# Patient Record
Sex: FEMALE | Race: BLACK OR AFRICAN AMERICAN
[De-identification: names, ages, dates, MRNs, and addresses within clinical notes are randomized per-mention and may not be internally consistent; named-entity substitution may affect disease eponyms.]

---

## 2018-10-15 ENCOUNTER — HOSPITAL ENCOUNTER (EMERGENCY)
Dept: HOSPITAL 35 - ER | Age: 48
Discharge: HOME | End: 2018-10-15
Payer: COMMERCIAL

## 2018-10-15 VITALS — BODY MASS INDEX: 29.45 KG/M2 | HEIGHT: 62 IN | WEIGHT: 160.01 LBS

## 2018-10-15 DIAGNOSIS — G43.909: ICD-10-CM

## 2018-10-15 DIAGNOSIS — I50.9: ICD-10-CM

## 2018-10-15 DIAGNOSIS — H60.91: ICD-10-CM

## 2018-10-15 DIAGNOSIS — R07.89: Primary | ICD-10-CM

## 2018-10-15 LAB
ALBUMIN SERPL-MCNC: 3.2 G/DL (ref 3.4–5)
ALT SERPL-CCNC: 24 U/L (ref 30–65)
ANION GAP SERPL CALC-SCNC: 8 MMOL/L (ref 7–16)
AST SERPL-CCNC: 28 U/L (ref 15–37)
BASOPHILS NFR BLD AUTO: 0.6 % (ref 0–2)
BILIRUB SERPL-MCNC: 0.5 MG/DL
BUN SERPL-MCNC: 14 MG/DL (ref 7–18)
CALCIUM SERPL-MCNC: 9 MG/DL (ref 8.5–10.1)
CHLORIDE SERPL-SCNC: 106 MMOL/L (ref 98–107)
CO2 SERPL-SCNC: 22 MMOL/L (ref 21–32)
CREAT SERPL-MCNC: 0.7 MG/DL (ref 0.6–1)
EOSINOPHIL NFR BLD: 1.2 % (ref 0–3)
ERYTHROCYTE [DISTWIDTH] IN BLOOD BY AUTOMATED COUNT: 14.9 % (ref 10.5–14.5)
GLUCOSE SERPL-MCNC: 88 MG/DL (ref 74–106)
GRANULOCYTES NFR BLD MANUAL: 65.9 % (ref 36–66)
HCT VFR BLD CALC: 39.5 % (ref 37–47)
HGB BLD-MCNC: 13.3 GM/DL (ref 12–15)
LYMPHOCYTES NFR BLD AUTO: 23.6 % (ref 24–44)
MAGNESIUM SERPL-MCNC: 2.2 MG/DL (ref 1.8–2.4)
MCH RBC QN AUTO: 31 PG (ref 26–34)
MCHC RBC AUTO-ENTMCNC: 33.6 G/DL (ref 28–37)
MCV RBC: 92.2 FL (ref 80–100)
MONOCYTES NFR BLD: 8.7 % (ref 1–8)
NEUTROPHILS # BLD: 5.6 THOU/UL (ref 1.4–8.2)
PLATELET # BLD: 278 THOU/UL (ref 150–400)
POTASSIUM SERPL-SCNC: 4.2 MMOL/L (ref 3.5–5.1)
PROT SERPL-MCNC: 7.1 G/DL (ref 6.4–8.2)
RBC # BLD AUTO: 4.28 MIL/UL (ref 4.2–5)
SODIUM SERPL-SCNC: 136 MMOL/L (ref 136–145)
WBC # BLD AUTO: 8.4 THOU/UL (ref 4–11)

## 2018-12-28 ENCOUNTER — HOSPITAL ENCOUNTER (OUTPATIENT)
Dept: HOSPITAL 35 - PAIN | Age: 48
End: 2018-12-28
Payer: COMMERCIAL

## 2018-12-28 VITALS — DIASTOLIC BLOOD PRESSURE: 67 MMHG | SYSTOLIC BLOOD PRESSURE: 114 MMHG

## 2018-12-28 VITALS — WEIGHT: 164.8 LBS | BODY MASS INDEX: 29.94 KG/M2 | HEIGHT: 62.01 IN

## 2018-12-28 DIAGNOSIS — I10: ICD-10-CM

## 2018-12-28 DIAGNOSIS — M19.90: Primary | ICD-10-CM

## 2018-12-28 DIAGNOSIS — Z91.81: ICD-10-CM

## 2018-12-28 DIAGNOSIS — Z79.891: ICD-10-CM

## 2018-12-28 NOTE — HPC
[facility]
[address]
[place]   [address]                     PAIN MANAGEMENT CONSULTATION  
_______________________________________________________________________________
 
Name:       PAPA SO                Room #:                     REG LAURITA FARRELL.#:      [id number]                       Account #:      [id number]  
Admission:  12/28/18    Attend Phys:    MARY Fuentes MD    
Discharge:              Date of Birth:  [birth year]  
                                                          Report #: [id number]
                                                                    [id number]   
_______________________________________________________________________________
THIS REPORT FOR:   //name//                          
 
CC: Kraig Fuentes
 
DATE OF SERVICE:  12/28/2018
 
 
CHIEF COMPLAINT:  Chest pain.
 
HISTORY OF PRESENT ILLNESS:  The patient is a 48-year-old female who has been
referred to the Pain Clinic for evaluation.  The patient states that on
10/28/2018, she was walking.  She slipped on some wet surface at work.  She fell
and has been having pain and discomfort in the low back area and chest area. 
Sometimes, she is experiencing a throbbing sensation and aching sensation as
well as some sharp pain.  Pain is made worse when she is working or moving in
the left upper chest wall area.  Also, notes some improvement when she lies
down.  She describes it as continuous, steady, aching, throbbing, sharp and
rates it as an 8/10.  The patient has had some problems with her heart.  This
was found to have a PFO 3 years ago and had surgery to close it.  She has had
some irregular heart beats.  She has been seen by her cardiologist recently. 
They state that they have worked her up and it does not appear that it is a
cardiogenic problem.  Seems more musculoskeletal in nature.  She has been
referred to the Pain Clinic for evaluation.  Denies any shortness of breath. 
Does note some increased pain and discomfort when she coughs, sneezes and with
movement.
 
ALLERGIES:  No known drug allergies.
 
MEDICATIONS:  Verapamil extended release 240 mg daily, aspirin 325 mg,
metoprolol 50 mg daily, Topamax 25 mg.
 
PAST MEDICAL HISTORY:
1.  Ear pain with ear infections has undergone 2 placements in her right ear.
2.  History of murmur.
3.  Mild congestive heart failure with cardiomegaly.
4.  PSVT.
5.  Migraines, ejection fraction 55%.
6.  ASD versus PFO.
7.  Palpitations.
8.  Right ventricular enlargement.
9.  Right atrial dilation.
 
PAST SURGICAL HISTORY:  On 08/24/2015, heart surgery to close a PFO.
 
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  She is
 
 
 
[facility]
[address]
[place]   [address]                     PAIN MANAGEMENT CONSULTATION  
_______________________________________________________________________________
 
Name:       PAPA SO                Room #:                     REG JACOB 
RamírezBRETRamírez#:      [id number]                       Account #:      [id number]  
Admission:  12/28/18    Attend Phys:    MARY Fuentes MD    
Discharge:              Date of Birth:  [birth year]  
                                                          Report #: [id number]
                                                                    [id number]   
_______________________________________________________________________________
working at this juncture.
 
REVIEW OF SYSTEMS:  Generally good health, recent weight change, headaches,
wears glasses, earache drainage, shortness of breath, chest pain, palpitations.
 
LABORATORY DATA:  No new laboratory values are available at the time of our
interview.
 
PAIN CLINIC ASSESSMENT/PQRS:
1.  Osteoarthritis.  The patient is not being treated for osteoarthritis or
rheumatoid arthritis.
2.  Height 5 feet 2 inches, weight 164 pounds, BMI is 30.
3.  Vital signs:  Blood pressure 114/67, pulse 78, respiratory rate 18, room air
saturation 100%.
4.  Pain intensity, 8/10.
5.  Fall risk.  The patient fell in 09/27/2018, fractured her left wrist and has
had chest pain since that fall.
6.  Blood thinner.  The patient is not on a blood thinning medication.
7.  Hypertension.  The patient is being treated for hypertension.
8.  Opioid therapy greater than 6 weeks.  The patient is not on an opioid
medication on a regular basis.
9.  Functional assessment tool, 31/70.
10.  Recreational drug use:  The patient denies.
11.  Tobacco:  The patient has never smoked.
12.  Alcoholic beverages.  The patient drinks alcoholic beverages on the
weekend.
 
PHYSICAL EXAMINATION:
GENERAL:  The patient is a well-developed, well-nourished black female, appears
her stated age.  She is alert, oriented x 3.  Affect is appropriate.  Speech is
slow.
HEENT:  Normocephalic, atraumatic.  Extraocular eye muscles intact.  Sclerae
nonicteric.  Mucous membranes are moist.  The patient is not complaining about
ear problems today.
NECK:  Without adenopathy or JVD.
EXTREMITIES:  Upper extremity muscle strength on the right judged to be 5/5 for
the major muscle groups.  Deep tendon reflexes are +2.  Left side upper arm, the
patient notes some soreness in the area of her clavicle and on the left chest
wall area.  She is able to get both hands above her head.  Notes some increased
soreness on the left side.  Has some soreness still remains of the left wrist. 
The patient does not have a cast on the wrist at this juncture.  Upper extremity
muscle strength is judged to be 5/5 with  strength, left and right.  The
patient has some decreased strength in her left wrist as opposed to the right
with confrontation secondary to soreness that still remains in the right wrist. 
Palpation in the left thoracic area near T1 and T2 cause and do reproduce pain
and discomfort.  Palpation in the costochondral area, near the sternum on the
 
 
 
[facility]
[address] [place]   [address]                     PAIN MANAGEMENT CONSULTATION  
_______________________________________________________________________________
 
Name:       PAPA SO                Room #:                     REG JACOB LANIER#:      [id number]                       Account #:      [id number]  
Admission:  12/28/18    Attend Phys:    MARY Fuentes MD    
Discharge:              Date of Birth:  [birth year]  
                                                          Report #: [id number]
                                                                    [id number]   
_______________________________________________________________________________
left down into the area of the xiphoid ____.  Palpation and pushing on the chest
wall reproduces pain in the area, but on the left side only.  Bowel sounds
present.  Lower extremity muscle strength is judged to be 5/5 for the major
muscle groups.  Deep tendon reflexes are +1 for the knees bilaterally, trace in
the area of the ankles.  Muscle strength is 5/5 for the major muscle groups of
lower extremity Anterior and posterior spring tests are negative.  Dajuan sign
is negative.  Upper extremity, the patient's ring finger on both left and right
side is significantly shorter than the index finger.
 
IMPRESSION:
1.  Costochondritis left side.
2.  History of murmur.
3.  History of congestive heart failure with cardiomegaly.
4.  History of paroxysmal supraventricular tachycardia.
5.  Migraines.
6.  Ejection fraction, 55%.
7.  Right ventricular enlargement.
8.  Right atrial dilation.
 
RECOMMENDATIONS:  We discussed treatment options with the patient.  Palpation of
the left costal cartilages do reproduce pain and discomfort in the patient's
discomfort.  At this juncture, we will try a conservative approach.  The patient
has been given a Medrol Dosepak to take over the next week.  She will also try
Mobic 15 mg 1 p.o. daily.  The patient has been given doxepin to see whether or
not this would be helpful with decreasing her pain and discomfort and improving
sleep.  She will return to the Pain Clinic.  Should her pain continue to be
problematic, we will consider an injections into the costal margins.
 
We would like to thank you for letting us participate in her care.  We hope she
continues to improve.
 
 
 
 
 
 
 
 
 
 
 
 
 
 
                         
   By:                               
                   
D: 12/30/18 1649                           _____________________________________
T: 12/31/18 0251                           MARY Fuentes MD              /nt

## 2019-01-11 ENCOUNTER — HOSPITAL ENCOUNTER (OUTPATIENT)
Dept: HOSPITAL 35 - PAIN | Age: 49
End: 2019-01-11
Payer: COMMERCIAL

## 2019-01-11 VITALS — DIASTOLIC BLOOD PRESSURE: 69 MMHG | SYSTOLIC BLOOD PRESSURE: 118 MMHG

## 2019-01-11 VITALS — BODY MASS INDEX: 29.58 KG/M2 | HEIGHT: 62.01 IN | WEIGHT: 162.8 LBS

## 2019-01-11 DIAGNOSIS — I51.7: ICD-10-CM

## 2019-01-11 DIAGNOSIS — G43.909: ICD-10-CM

## 2019-01-11 DIAGNOSIS — I47.1: ICD-10-CM

## 2019-01-11 DIAGNOSIS — M94.0: Primary | ICD-10-CM

## 2019-01-11 DIAGNOSIS — Z79.899: ICD-10-CM

## 2019-01-11 DIAGNOSIS — Z86.79: ICD-10-CM

## 2019-01-11 NOTE — HPC
[facility]
[address]
[place]   [address]                     PAIN MANAGEMENT CONSULTATION  
_______________________________________________________________________________
 
Name:       PAPA SO                Room #:                     REG JACOB 
JAMI.#:      [id number]                       Account #:      [id number]  
Admission:  01/11/19    Attend Phys:    MARY Fuentes MD    
Discharge:              Date of Birth:  [birth year]  
                                                          Report #: [id number]
                                                                    [id number]   
_______________________________________________________________________________
THIS REPORT FOR:   //name//                          
 
CC: Kraig Fuentes
 
DATE OF SERVICE:  01/11/2019
 
 
CHIEF COMPLAINT:  The pain is better, but I am still having some pain.
 
HISTORY OF PRESENT ILLNESS:  The patient is a 48-year-old female who has been
seen in the Pain Clinic.  As you may recall, she was injured on 10/28/2018. 
States that she was walking and fell on a wet surface while at work.  Since that
time, she has had pain and discomfort in the left chest wall area.  As you may
recall when she fell, she extended her left arm.  She fractured her wrist. 
Since that time, the pain has been problematic in the left wrist with some
soreness as well as in the left chest area near the sternum.  She was given a
Medrol Dosepak and is not on a nonsteroidal anti-inflammatory medication. 
Overall, she feels that her pain improved.  Rates it as a 2/10 down from an 8
when we saw her last.  She feels that things are going reasonably well.  She has
had no complications from the medications.  At this juncture, she would like to
continue with her treatment with use of a conservative approach.
 
ALLERGIES:  No known drug allergies.
 
CURRENT MEDICATIONS:  Verapamil extended release 240 mg, aspirin 325 mg,
metoprolol 50 mg, Topamax 25 mg, Meloxicam 15 mg daily, Medrol Dosepak.
 
PAIN CLINIC ASSESSMENT/PQRS:
1.  Osteoarthritis.  The patient is not being treated for osteoarthritis or
rheumatoid arthritis.
2.  Height 5 feet 2 inches, weight 162 pounds, BMI is 29.8.
3.  Vital signs:  Blood pressure 118/69, pulse 84, respiratory rate 16, room air
saturations 100%.
4.  Pain intensity, 2/10.
5.  Fall risk.  The patient has not fallen since we saw her.  She did fall on
09/27/2018 and fractured her left wrist and has had chest pain since that time.
6.  Blood thinner.  The patient is not on a blood thinning medication.
7.  Hypertension.  The patient is being treated for hypertension.
8.  Opioids greater than 6 weeks.  The patient is not on her opioid regimen.
9.  Risk assessment tool, low for opioid use.
10.  Functional assessment tool, 31/70.
11.  Recreational drug use.  The patient denies use of recreational drugs.
12.  Tobacco:  The patient has never smoked.
13.  Alcohol:  The patient occasionally drinks alcoholic beverages.
 
 
 
 
[address]   [address]                     PAIN MANAGEMENT CONSULTATION  
_______________________________________________________________________________
 
Name:       PAPA SO                Room #:                     REG CL 
YOLANDE#:      [id number]                       Account #:      [id number]  
Admission:  01/11/19    Attend Phys:    MARY Fuentes MD    
Discharge:              Date of Birth:  [birth year]  
                                                          Report #: [id number]
                                                                    [id number]   
_______________________________________________________________________________
PHYSICAL EXAMINATION:
GENERAL:  The patient is a well-developed, well-nourished black female, appears
her stated age.  She is alert and oriented x 3.  Affect is appropriate.  Speech
is slow.  The patient is smiling.
HEENT:  Normocephalic, atraumatic.  Extraocular eye muscles intact.
NECK:  Without adenopathy or JVD.
EXTREMITIES:  Upper extremity muscle strength is judged to be 5/5 for the major
muscle groups.  The patient has less pain and discomfort in the left sternal
area.  Continue to have some soreness in her left arm, which has been fractured.
 Lower extremity muscle strength judged to be 5/5 for the major muscle groups. 
Less pain and discomfort in the T2/thoracic area.  Less discomfort in the area
of xiphoid.
 
IMPRESSION:
1.  Costochondritis, left side improved with current medical regimen.
2.  History of murmur.
3.  History of congestive heart failure with cardiomyopathy.
4.  Paroxysmal supraventricular tachycardia.
5.  Migraines.
6.  Ejection fraction 55%.
7.  Right ventricular enlargement.
8.  Right atrial dilation.
 
RECOMMENDATIONS:  We discussed treatment options with the patient.  Overall,
things have gone well.  The patient has not had any significant problems with
the medication.  At this juncture, we will continue with the current use of
nonsteroidal anti-inflammatory medication, Mobic 15 mg 1 p.o. daily.  She will
also take another Medrol Dosepak.  Hopefully, things will continue to improve
and as time goes on she will get back to normal.  If her pain continues to be
problematic and does not resolve, we could consider an injection in the area of
the sternum for the intercostal muscles.
 
We would like to thank you for letting us participate in her care.  We hope she
continues to improve.
 
 
 
 
 
 
 
 
 
 
                         
   By:                               
                   
D: 01/11/19 1753                           _____________________________________
T: 01/12/19 0332                           MARY Fuentes MD              /nt

## 2019-01-25 ENCOUNTER — HOSPITAL ENCOUNTER (OUTPATIENT)
Dept: HOSPITAL 35 - PAIN | Age: 49
End: 2019-01-25
Payer: COMMERCIAL

## 2019-01-25 VITALS — SYSTOLIC BLOOD PRESSURE: 106 MMHG | DIASTOLIC BLOOD PRESSURE: 70 MMHG

## 2019-01-25 VITALS — WEIGHT: 165.4 LBS | HEIGHT: 62.01 IN | BODY MASS INDEX: 30.05 KG/M2

## 2019-01-25 DIAGNOSIS — I47.1: ICD-10-CM

## 2019-01-25 DIAGNOSIS — M94.0: Primary | ICD-10-CM

## 2019-01-25 DIAGNOSIS — G43.909: ICD-10-CM

## 2019-01-25 DIAGNOSIS — I51.7: ICD-10-CM

## 2019-01-25 DIAGNOSIS — I50.9: ICD-10-CM

## 2019-01-25 DIAGNOSIS — Z79.899: ICD-10-CM
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
## 2019-01-25 NOTE — NUR
Pain Clinic Assessment:
 
1. History of Osteoarthritis:
*
   History of Rheumatoid Arthritis:
*
 
2. Height: 5 ft. 2 in. 157.5 cm.
   Weight: 165.4 lb.  oz. 75.025 kg.
   Patient's BMI: 30.2
 
3. Vital Signs:
   BP: 106/70 Pulse: 82 Resp: 14
   Temp:  02 Sat: 97 ECG Mon:
 
4. Pain Intensity: 5 AVG
 
5. Fall Risk:
   Dizziness: N  Needs help standing or walking: N
   Fallen in the last 3 months: N
   Fall risk comments:
FELL SEPTEMBER 27TH, FX LEFT WRIST. CHEST PAIN SINCE
 
6. Patient on Blood Thinner: None
 
7. History of Hypertension: Y
 
8. Opioid Therapy greater than 6 weeks: N
   Opiate Contract Signed:
 
9. Risk Assessment Tool Provided:
 
10. Functional Assessment Tool: 31/70
 
11. Recreational Drug Use: Never Drug Type:
    Tobacco Use: Never Smoker Tobacco Type:
       Amount or Packs/day:  How Many Years:
    Alcohol Use: Yes  Frequency:  Quant:

## 2019-03-11 ENCOUNTER — HOSPITAL ENCOUNTER (EMERGENCY)
Dept: HOSPITAL 35 - ER | Age: 49
Discharge: HOME | End: 2019-03-11
Payer: COMMERCIAL

## 2019-03-11 VITALS — WEIGHT: 163.01 LBS | BODY MASS INDEX: 30 KG/M2 | HEIGHT: 62 IN

## 2019-03-11 VITALS — SYSTOLIC BLOOD PRESSURE: 126 MMHG | DIASTOLIC BLOOD PRESSURE: 75 MMHG

## 2019-03-11 DIAGNOSIS — R53.83: Primary | ICD-10-CM

## 2019-03-11 LAB
ALBUMIN SERPL-MCNC: 3.6 G/DL (ref 3.4–5)
ALT SERPL-CCNC: 21 U/L (ref 30–65)
ANION GAP SERPL CALC-SCNC: 8 MMOL/L (ref 7–16)
AST SERPL-CCNC: 17 U/L (ref 15–37)
BASOPHILS NFR BLD AUTO: 1.8 % (ref 0–2)
BILIRUB SERPL-MCNC: 0.5 MG/DL
BUN SERPL-MCNC: 10 MG/DL (ref 7–18)
CALCIUM SERPL-MCNC: 8.5 MG/DL (ref 8.5–10.1)
CHLORIDE SERPL-SCNC: 104 MMOL/L (ref 98–107)
CO2 SERPL-SCNC: 26 MMOL/L (ref 21–32)
CREAT SERPL-MCNC: 0.8 MG/DL (ref 0.6–1)
EOSINOPHIL NFR BLD: 1.6 % (ref 0–3)
ERYTHROCYTE [DISTWIDTH] IN BLOOD BY AUTOMATED COUNT: 14.7 % (ref 10.5–14.5)
GLUCOSE SERPL-MCNC: 106 MG/DL (ref 74–106)
GRANULOCYTES NFR BLD MANUAL: 58.4 % (ref 36–66)
HCT VFR BLD CALC: 39.8 % (ref 37–47)
HGB BLD-MCNC: 13.2 GM/DL (ref 12–15)
LYMPHOCYTES NFR BLD AUTO: 29.8 % (ref 24–44)
MCH RBC QN AUTO: 30.8 PG (ref 26–34)
MCHC RBC AUTO-ENTMCNC: 33.1 G/DL (ref 28–37)
MCV RBC: 92.8 FL (ref 80–100)
MONOCYTES NFR BLD: 8.4 % (ref 1–8)
NEUTROPHILS # BLD: 3.1 THOU/UL (ref 1.4–8.2)
PLATELET # BLD: 258 THOU/UL (ref 150–400)
POTASSIUM SERPL-SCNC: 3.8 MMOL/L (ref 3.5–5.1)
PROT SERPL-MCNC: 6.9 G/DL (ref 6.4–8.2)
RBC # BLD AUTO: 4.29 MIL/UL (ref 4.2–5)
SODIUM SERPL-SCNC: 138 MMOL/L (ref 136–145)
TROPONIN I SERPL-MCNC: <0.06 NG/ML (ref ?–0.06)
WBC # BLD AUTO: 5.3 THOU/UL (ref 4–11)

## 2020-01-21 ENCOUNTER — HOSPITAL ENCOUNTER (EMERGENCY)
Dept: HOSPITAL 35 - ER | Age: 50
Discharge: HOME | End: 2020-01-21
Payer: COMMERCIAL

## 2020-01-21 VITALS — DIASTOLIC BLOOD PRESSURE: 72 MMHG | SYSTOLIC BLOOD PRESSURE: 132 MMHG

## 2020-01-21 VITALS — WEIGHT: 170 LBS | HEIGHT: 62 IN | BODY MASS INDEX: 31.28 KG/M2

## 2020-01-21 DIAGNOSIS — I10: ICD-10-CM

## 2020-01-21 DIAGNOSIS — M54.5: Primary | ICD-10-CM

## 2020-01-21 LAB
ALBUMIN SERPL-MCNC: 3.9 G/DL (ref 3.4–5)
ALT SERPL-CCNC: 35 U/L (ref 30–65)
ANION GAP SERPL CALC-SCNC: 8 MMOL/L (ref 7–16)
AST SERPL-CCNC: 24 U/L (ref 15–37)
BACTERIA-REFLEX: (no result) /HPF
BASOPHILS NFR BLD AUTO: 1.1 % (ref 0–2)
BILIRUB SERPL-MCNC: 0.3 MG/DL
BILIRUB UR-MCNC: NEGATIVE MG/DL
BUN SERPL-MCNC: 13 MG/DL (ref 7–18)
CALCIUM SERPL-MCNC: 9.7 MG/DL (ref 8.5–10.1)
CHLORIDE SERPL-SCNC: 102 MMOL/L (ref 98–107)
CO2 SERPL-SCNC: 26 MMOL/L (ref 21–32)
COLOR UR: YELLOW
CREAT SERPL-MCNC: 0.8 MG/DL (ref 0.6–1)
EOSINOPHIL NFR BLD: 2 % (ref 0–3)
ERYTHROCYTE [DISTWIDTH] IN BLOOD BY AUTOMATED COUNT: 15.1 % (ref 10.5–14.5)
GLUCOSE SERPL-MCNC: 107 MG/DL (ref 74–106)
GRANULOCYTES NFR BLD MANUAL: 64.6 % (ref 36–66)
HCT VFR BLD CALC: 38.3 % (ref 37–47)
HGB BLD-MCNC: 12.3 GM/DL (ref 12–15)
KETONES UR STRIP-MCNC: NEGATIVE MG/DL
LIPASE: 95 U/L (ref 73–393)
LYMPHOCYTES NFR BLD AUTO: 23.9 % (ref 24–44)
MCH RBC QN AUTO: 29.7 PG (ref 26–34)
MCHC RBC AUTO-ENTMCNC: 32.1 G/DL (ref 28–37)
MCV RBC: 92.6 FL (ref 80–100)
MONOCYTES NFR BLD: 8.4 % (ref 1–8)
NEUTROPHILS # BLD: 4.2 THOU/UL (ref 1.4–8.2)
PLATELET # BLD: 237 THOU/UL (ref 150–400)
POTASSIUM SERPL-SCNC: 4.1 MMOL/L (ref 3.5–5.1)
PROT SERPL-MCNC: 7.3 G/DL (ref 6.4–8.2)
RBC # BLD AUTO: 4.13 MIL/UL (ref 4.2–5)
RBC # UR STRIP: (no result) /UL
SODIUM SERPL-SCNC: 136 MMOL/L (ref 136–145)
SP GR UR STRIP: 1.02 (ref 1–1.03)
SQUAMOUS: (no result) /LPF (ref 0–3)
URINE CLARITY: CLEAR
URINE GLUCOSE-RANDOM*: NEGATIVE
URINE LEUKOCYTES-REFLEX: NEGATIVE
URINE NITRITE-REFLEX: NEGATIVE
URINE PROTEIN (DIPSTICK): NEGATIVE
UROBILINOGEN UR STRIP-ACNC: 0.2 E.U./DL (ref 0.2–1)
WBC # BLD AUTO: 6.5 THOU/UL (ref 4–11)

## 2020-05-29 ENCOUNTER — HOSPITAL ENCOUNTER (OUTPATIENT)
Dept: HOSPITAL 35 - PAIN | Age: 50
End: 2020-05-29
Payer: COMMERCIAL

## 2020-05-29 VITALS — WEIGHT: 182.8 LBS | HEIGHT: 62.01 IN | BODY MASS INDEX: 33.22 KG/M2

## 2020-05-29 VITALS — DIASTOLIC BLOOD PRESSURE: 67 MMHG | SYSTOLIC BLOOD PRESSURE: 127 MMHG

## 2020-05-29 DIAGNOSIS — Z79.899: ICD-10-CM

## 2020-05-29 DIAGNOSIS — I10: ICD-10-CM

## 2020-05-29 DIAGNOSIS — R10.9: Primary | ICD-10-CM

## 2020-05-29 DIAGNOSIS — F11.90: ICD-10-CM

## 2020-05-29 NOTE — NUR
Pain Clinic Assessment:
 
1. History of Osteoarthritis:
SPINE
   History of Rheumatoid Arthritis:
Not Applicable
 
2. Height: 5 ft. 2 in. 157.5 cm.
   Weight: 182.8 lb.  oz. 82.918 kg.
   Patient's BMI: 33.4
 
3. Vital Signs:
   BP: 127/67 Pulse: 94 Resp: 16
   Temp:  02 Sat: 100 ECG Mon:
 
4. Pain Intensity: 7-8
 
5. Fall Risk:
   Dizziness: N  Needs help standing or walking: N
   Fallen in the last 3 months: N
   Fall risk comments:
FELL SEPTEMBER 27TH, FX LEFT WRIST. CHEST PAIN SINCE
 
6. Patient on Blood Thinner: None
 
7. History of Hypertension: Y
 
8. Opioid Therapy greater than 6 weeks: N
   Opiate Contract Signed:
 
9. Risk Assessment Tool Provided:
 
10. Functional Assessment Tool: 31/70
 
11. Recreational Drug Use: Never Drug Type:
    Tobacco Use: Never Smoker Tobacco Type:
       Amount or Packs/day:  How Many Years:
    Alcohol Use: Yes  Frequency:  Quant:

## 2020-05-29 NOTE — HPC
[facility]
[address] [place]
[place]   [address]                     PAIN MANAGEMENT CONSULTATION  
_______________________________________________________________________________
 
Name:       PAPA SO                Room #:                     REG JACOB FARRELL#:      [id number]                       Account #:      [id number]  
Admission:  05/29/20    Attend Phys:    MARY Fuentes MD    
Discharge:              Date of Birth:  [birth year]  
                                                          Report #: [id number]
                                                                    [id number]   
_______________________________________________________________________________
THIS REPORT FOR:  
 
cc:  Kraig Moore MD, Douglas James MD Brown,MARY England MD                                            ~
CC: Kraig Fuentes
 
DATE OF SERVICE:  05/29/2020
 
 
CHIEF COMPLAINT:  Right flank and side pain.
 
HISTORY:  The patient is a 49-year-old female who has been referred to the pain
clinic for evaluation of right sided pain.  She has noticed that the pain is
more problematic with activity.  If she is sweeping, she notes worsening of the
pain.  Working and movement causes increasing pain.  She has not seen a
chiropractor.  She has not had physical therapy.  She notes that the pain
improves after sitting down for about 5-10 minutes.  It is not problematic when
she is asleep.  She has not tried ice or heat.  She notes it as soreness in the
right rib area.  She has had some pain and soreness in the sternal area in 2019.
 This was felt to be costochondritis.  She was provided a Medrol Dosepak and had
an injection.  She noted that pain has improved.  She denies any new trauma.
 
ALLERGIES:  No known drug allergies.
 
CURRENT MEDICATIONS:  Verapamil 240 mg, aspirin 325 mg, metoprolol 50 mg, and
Topamax 25 mg.
 
PAIN CLINIC ASSESSMENT AND PQRS:
1.  The patient is not being treated for osteoarthritis or rheumatoid arthritis.
2.  Height 5 feet 2 inches, weight 182 pounds, BMI is 33.4.
3.  Vital Signs:  Blood pressure 127/67, pulse 94, respiratory rate 16, room air
saturation 100%.
4.  Pain intensity 7-8/10.
5.  Fall risk.  The patient states that she fell in September and fractured her
left wrist in 2019.  She has had some discomfort and chest pain since then.
6.  Blood thinner.  The patient is not on a blood thinning medication.
7.  Hypertension.  The patient is being treated for hypertension.
8.  Opioids greater than 6 weeks.
9.  Risk assessment tool.  The patient received medication from one source.
10.  Functional assessment tool 31/70.
11.  Recreational drug use.  The patient denies.
12.  Tobacco:  The patient has never smoked.
13.  Alcohol:  The patient occasionally drinks alcoholic beverages.
 
 
 
 
[facility]
[address] [place] [address]                     PAIN MANAGEMENT CONSULTATION  
_______________________________________________________________________________
 
Name:       PAPA SO                Room #:                     [facility]      [id number]                       Account #:      [id number]  
Admission:  05/29/20    Attend Phys:    MARY Fuentes MD    
Discharge:              Date of Birth:  [birth year]  
                                                          Report #: [id number]
                                                                    [id number]   
_______________________________________________________________________________
PHYSICAL EXAMINATION:
GENERAL:  The patient is a well-developed, well-nourished black female, appears
her stated age.  She is alert and oriented x 3.  Her affect is appropriate. 
Speech is fluent.
HEENT:  Normocephalic, atraumatic.  Extraocular eye muscles intact.  Sclerae
nonicteric.  Mucous membranes are moist.
NECK:  Without adenopathy or JVD.
HEART:  Regular rate.
ABDOMEN:  Nontender.
MUSCULOSKELETAL:  The patient does have some pain and discomfort on the right
flank.  Palpation in this area does cause some increased pain and discomfort. 
Pressure on the ribs approximately T11, T10, T9 cause reproduction of pain and
discomfort similar to that which she is complaining of.  It also corresponds to
her having more pain with sweeping, bending, and twisting.
 
IMPRESSION:
1.  Costochondritis right side.
2.  History of heart murmur.
3.  History of congestive heart failure with cardiomyopathy.
4.  Proximal supraventricular tachycardia history.
5.  Migraines.
6.  Ejection fraction 55%.
7.  Right ventricular enlargement.
8.  Right atrial dilation.
 
RECOMMENDATIONS:  We discussed treatment options with the patient.  At this
juncture, we will try a Medrol Dosepak.  We have explained to the patient, we
could inject the areas with local anesthetic and steroid.  I think to try a more
conservative approach would be reasonable.  We can also have the patient follow
up with physical therapy.  She has not had physical therapy.  She will use
nonsteroidal anti-inflammatory medications.  She will take the Medrol Dosepak. 
She will follow up with physical therapy.
 
We would like to thank you for letting us participate in her care.  We hope she
continues to improve.
 
 
 
 
 
 
 
 
 
                         
   By:                               
                   
D: 06/02/20 0024                           _____________________________________
T: 06/02/20 0805                           MARY Fuentes MD              /nt

## 2022-08-04 NOTE — NUR
Pain Clinic Assessment:
 
1. History of Osteoarthritis:
 
   History of Rheumatoid Arthritis:
 
 
2. Height: 5 ft. 2 in. 157.5 cm.
   Weight: 164.8 lb.  oz. 74.753 kg.
   Patient's BMI: 30.1
 
3. Vital Signs:
   BP: 114/67 Pulse: 78 Resp: 18
   Temp:  02 Sat: 100 ECG Mon:
 
4. Pain Intensity: 8
 
5. Fall Risk:
   Dizziness: N  Needs help standing or walking: N
   Fallen in the last 3 months: Y
   Fall risk comments:
FELL SEPTEMBER 27TH, FX LEFT WRIST. CHEST PAIN SINCE
 
6. Patient on Blood Thinner: None
 
7. History of Hypertension: Y
 
8. Opioid Therapy greater than 6 weeks: N
   Opiate Contract Signed:
 
9. Risk Assessment Tool Provided:
 
10. Functional Assessment Tool: 31/70
 
11. Recreational Drug Use: Never Drug Type:
    Tobacco Use: Never Smoker Tobacco Type:
       Amount or Packs/day:  How Many Years:
    Alcohol Use: Yes  Frequency: Weekly Quant: ON THE WEEKENDS
[Post Op: _________] : a [unfilled] post op visit